# Patient Record
Sex: FEMALE | Race: WHITE | Employment: STUDENT | URBAN - NONMETROPOLITAN AREA
[De-identification: names, ages, dates, MRNs, and addresses within clinical notes are randomized per-mention and may not be internally consistent; named-entity substitution may affect disease eponyms.]

---

## 2018-09-04 ENCOUNTER — OFFICE VISIT (OUTPATIENT)
Dept: FAMILY MEDICINE | Facility: OTHER | Age: 23
End: 2018-09-04
Attending: FAMILY MEDICINE
Payer: COMMERCIAL

## 2018-09-04 VITALS
DIASTOLIC BLOOD PRESSURE: 64 MMHG | WEIGHT: 163 LBS | HEIGHT: 65 IN | HEART RATE: 68 BPM | BODY MASS INDEX: 27.16 KG/M2 | SYSTOLIC BLOOD PRESSURE: 100 MMHG

## 2018-09-04 DIAGNOSIS — Z00.00 ROUTINE GENERAL MEDICAL EXAMINATION AT A HEALTH CARE FACILITY: Primary | ICD-10-CM

## 2018-09-04 DIAGNOSIS — Z12.4 CERVICAL CANCER SCREENING: ICD-10-CM

## 2018-09-04 PROCEDURE — 99395 PREV VISIT EST AGE 18-39: CPT | Performed by: FAMILY MEDICINE

## 2018-09-04 PROCEDURE — 88142 CYTOPATH C/V THIN LAYER: CPT | Performed by: FAMILY MEDICINE

## 2018-09-04 PROCEDURE — G0123 SCREEN CERV/VAG THIN LAYER: HCPCS | Performed by: FAMILY MEDICINE

## 2018-09-04 RX ORDER — NORGESTIMATE AND ETHINYL ESTRADIOL 7DAYSX3 28
1 KIT ORAL DAILY
COMMUNITY
End: 2018-09-04

## 2018-09-04 RX ORDER — NORGESTIMATE AND ETHINYL ESTRADIOL
1 KIT DAILY
Refills: 0 | COMMUNITY
Start: 2018-07-10

## 2018-09-04 ASSESSMENT — PAIN SCALES - GENERAL: PAINLEVEL: NO PAIN (0)

## 2018-09-04 NOTE — PROGRESS NOTES
Female Physical Note    Concerns today: No special concerns today.    Nursing Notes:   Gisela Mahoney LPN  2018 10:19 AM  Signed  Patient is here for her yearly physical.  Gisela Mahoney LPN .......2018 10:14 AM       ROS:  CONSTITUTIONAL: no fatigue, no unexpected change in weight  SKIN: no worrisome rashes, no worrisome moles, no worrisome lesions  EYES: no acute vision problems or changes  ENT: no ear problems, no mouth problems, no throat problems  RESP: no significant cough, no shortness of breath  CV: no chest pain, no palpitations, no new or worsening peripheral edema  GI: no nausea, no vomiting, no constipation, no diarrhea    Sexually Active: Yes  Sexual concerns: No   Contraception:OCP-see med list   P: 0  Menarche:  Patient's last menstrual period was 2018. Menses: q 30 days  STD History: Neg  Last Pap Smear Date:  normal  Abnormal Pap History: None    There is no problem list on file for this patient.      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    Patient is an established patient of this clinic..    Social History   Substance Use Topics     Smoking status: Never Smoker     Smokeless tobacco:  "Never Used     Alcohol use No     Partnered  Children ? no    Has anyone hurt you physically, for example by pushing, hitting, slapping or kicking you or forcing you to have sex? Denies  Do you feel threatened or controlled by a partner, ex-partner or anyone in your life? Denies    RISK BEHAVIORS AND HEALTHY HABITS:  Tobacco Use/Smoking: None  Illicit Drug Use: None  Do you use alcohol? No  Diet (5-7 servings of fruits/veg daily): Yes   Exercise (30 min accumulated most days):Yes  Dental Care: Yes   Calcium 1500 mg/d:  Yes  Seat Belt Use: Yes     Pap every 3 years for women 21-29. Recommended and patient accepted testing.    CV Risk based on Pooled Cohort Risk:   Pooled Cohort Risk Calculator    Immunization History   Administered Date(s) Administered     DTAP (<7y) 03/12/1997     DTaP / Hep B / IPV 1995, 1995, 01/09/1996, 03/19/2001     HepA, Unspecified 03/12/1997     HepB, Unspecified 1995     Hib, Unspecified 1995, 1995, 01/09/1996, 03/12/1997     MMR 08/01/1996, 03/19/2001     Meningococcal (Menactra ) 08/13/2008     Polio, Unspecified  1995, 1995, 01/09/1996, 03/19/2001     Tdap (Adult) Unspecified Formulation 08/13/2008    Reviewed Immunization Record Today    EXAMINATION:   /64 (BP Location: Right arm, Patient Position: Sitting, Cuff Size: Adult Regular)  Pulse 68  Ht 5' 4.5\" (1.638 m)  Wt 163 lb (73.9 kg)  LMP 08/27/2018  BMI 27.55 kg/m2  GENERAL: healthy, alert and no distress  EYES: Eyes grossly normal to inspection, extraocular movements - intact, and PERRL  HENT: ear canals- normal; TMs- normal; Nose- normal; Mouth- no ulcers, no lesions  NECK: no tenderness, no adenopathy, no asymmetry, no masses, no stiffness; thyroid- normal to palpation  RESP: lungs clear to auscultation - no rales, no rhonchi, no wheezes  CV: regular rates and rhythm, normal S1 S2, no S3 or S4 and no murmur, no click or rub -  ABDOMEN: soft, no tenderness, no  hepatosplenomegaly, " no masses, normal bowel sounds  SKIN: no suspicious lesions, no rashes  NEURO: strength and tone- normal, sensory exam- grossly normal, mentation- intact, speech- normal, reflexes- symmetric  BACK: no CVA tenderness, no paralumbar tenderness  - female: cervix- normal, adnexae- normal; uterus- normal, no masses, no discharge  PSYCH: Alert and oriented times 3; speech- coherent , normal rate and volume; able to articulate logical thoughts, able to abstract reason, no tangential thoughts, no hallucinations or delusions, affect- normal  LYMPHATICS: ant. cervical- normal, post. cervical- normal, axillary- normal, supraclavicular- normal, inguinal- normal    ASSESSMENT:  1. Routine general medical examination at a health care facility    2. Cervical cancer screening          PLAN:  Discussed options for contraception.  She strongly desires IUD placement.  She will return  after her next menses for placement.  Warned that if she comes off birth control pills her acne may worsen.  Pap smear was obtained today, recommend every 3 years with HPV.  Declines STD screening.  She has had one partner and STD screening last year was negative.  Immunizations are current.      Liudmila Francis MD

## 2018-09-04 NOTE — LETTER
September 6, 2018    Eloisa Villalta  [address] [place] [address]    Dear Eloisa,  We are happy to inform you that your PAP smear result  is normal.  We are now able to do a follow up test on PAP smears. The DNA test is for HPV (Human Papilloma Virus). Cervical cancer is closely linked with certain types of HPV. Your results showed no evidence of high risk HPV.  Therefore we recommend you return in 1 year for your next pap smear.  You will still need to return to the clinic every year for an annual exam and other preventive tests.  Please contact the clinic at [phone] with any questions.  Sincerely,    Liudmila Francis MD

## 2018-09-04 NOTE — MR AVS SNAPSHOT
After Visit Summary   9/4/2018    Eloisa Villalta    MRN: [id number]           Patient Information     Date Of Birth          1995        Visit Information        Provider Department      9/4/2018 10:00 AM Liudmila Yoon MD [facility]        Today's Diagnoses     Routine general medical examination at a health care facility    -  1    Cervical cancer screening           Follow-ups after your visit        Your next 10 appointments already scheduled     Sep 17, 2018  1:30 PM CDT   Office Visit with Liudmila Mccollum MD   [facility] ([facility])    [address]  [place] [id number] [phone]           Bring a current list of meds and any records pertaining to this visit. For Physicals, please bring immunization records and any forms needing to be filled out. Please arrive 10 minutes early to complete paperwork.              Future tests that were ordered for you today     Open Future Orders        Priority Expected Expires Ordered    HPV High Risk Types DNA Cervical Routine  9/4/2019 9/4/2018            Who to contact     If you have questions or need follow up information about today's clinic visit or your schedule please contact [facility] directly at [phone].  Normal or non-critical lab and imaging results will be communicated to you by MyChart, letter or phone within 4 business days after the clinic has received the results. If you do not hear from us within 7 days, please contact the clinic through MyChart or phone. If you have a critical or abnormal lab result, we will notify you by phone as soon as possible.  Submit refill requests through [employer] or call your pharmacy and they will forward the refill request to us. Please allow 3 business days for your refill to be completed.          Additional Information About Your Visit        MyChart  "Information     [employer] lets you send messages to your doctor, view your test results, renew your prescriptions, schedule appointments and more. To sign up, go to [email or web address] . Click on \"Log in\" on the left side of the screen, which will take you to the Welcome page. Then click on \"Sign up Now\" on the right side of the page.     You will be asked to enter the access code listed below, as well as some personal information. Please follow the directions to create your username and password.     Your access code is: [email or web address]  Expires: 12/3/2018 11:07 AM     Your access code will  in 90 days. If you need help or a new code, please call your [facility] clinic or [phone].        Care EveryWhere ID     This is your [facility] EveryWhere ID. This could be used by other organizations to access your [facility] medical records  [id number]        Your Vitals Were     Pulse Height Last Period BMI (Body Mass Index)          68 5' 4.5\" (1.638 m) 2018 27.55 kg/m2         Blood Pressure from Last 3 Encounters:   18 100/64    Weight from Last 3 Encounters:   18 163 lb (73.9 kg)              We Performed the Following     Pap Screen Thin Prep        Primary Care Provider Office Phone # Fax #    [facility] [phone] [address]  [facility] [address]        Equal Access to Services     [facility] AH: Hadii richard Rajput, waaxda luqadaha, qaybta kaalmada luceroda, shala farley. So [facility] [phone].    ATENCIÓN: Si habla español, tiene a pereira disposición servicios gratuitos de asistencia lingüística. Llame al [phone].    We comply with applicable federal civil rights laws and [place] laws. We do not discriminate on the basis of race, color, national origin, age, disability, sex, sexual orientation, or gender identity.            Thank you!     Thank you for choosing [facility]  for " your care. Our goal is always to provide you with excellent care. Hearing back from our patients is one way we can continue to improve our services. Please take a few minutes to complete the written survey that you may receive in the mail after your visit with us. Thank you!             Your Updated Medication List - Protect others around you: Learn how to safely use, store and throw away your medicines at www.disposemymeds.org.          This list is accurate as of 9/4/18 11:07 AM.  Always use your most recent med list.                   Brand Name Dispense Instructions for use Diagnosis    TRI-LO-TRACIE 0.18/0.215/0.25 MG-25 MCG per tablet   Generic drug:  norgestim-eth estrad triphasic      Take 1 tablet by mouth daily

## 2018-09-06 ASSESSMENT — PATIENT HEALTH QUESTIONNAIRE - PHQ9: SUM OF ALL RESPONSES TO PHQ QUESTIONS 1-9: 0

## 2018-09-17 ENCOUNTER — OFFICE VISIT (OUTPATIENT)
Dept: FAMILY MEDICINE | Facility: OTHER | Age: 23
End: 2018-09-17
Attending: FAMILY MEDICINE
Payer: COMMERCIAL

## 2018-09-17 VITALS
HEIGHT: 65 IN | SYSTOLIC BLOOD PRESSURE: 114 MMHG | TEMPERATURE: 98 F | HEART RATE: 72 BPM | RESPIRATION RATE: 12 BRPM | BODY MASS INDEX: 27.42 KG/M2 | WEIGHT: 164.6 LBS | DIASTOLIC BLOOD PRESSURE: 68 MMHG

## 2018-09-17 DIAGNOSIS — Z30.430 ENCOUNTER FOR INTRAUTERINE DEVICE PLACEMENT: Primary | ICD-10-CM

## 2018-09-17 LAB — HCG UR QL: NEGATIVE

## 2018-09-17 PROCEDURE — 25000125 ZZHC RX 250: Performed by: FAMILY MEDICINE

## 2018-09-17 PROCEDURE — 81025 URINE PREGNANCY TEST: CPT | Performed by: FAMILY MEDICINE

## 2018-09-17 PROCEDURE — 58300 INSERT INTRAUTERINE DEVICE: CPT | Performed by: FAMILY MEDICINE

## 2018-09-17 RX ADMIN — LEVONORGESTREL 13.5 MG: 13.5 INTRAUTERINE DEVICE INTRAUTERINE at 15:26

## 2018-09-17 ASSESSMENT — PAIN SCALES - GENERAL: PAINLEVEL: NO PAIN (0)

## 2018-09-17 NOTE — PROGRESS NOTES
INDICATIONS:                                                      Is a pregnancy test required: Yes.  Was it positive or negative?  Negative  Was a consent obtained?  Yes    Eloisa Villalta is a 23 year old female, No obstetric history on file.  who presents for insertion of an IUD. The risks, benefits and alternatives of IUD insertion were discussed in detail previously. She also has reviewed the product brochure.  She has elected to go ahead with the insertion  today and her questions were answered. Consent was signed. Her LMP began on 8/20 and was normal in duration and amount of flow. A pregnancy test was performed today:  Yes    Today's PHQ-2 Score:   PHQ-2 ( 1999 Pfizer) 9/17/2018   Q1: Little interest or pleasure in doing things 0   Q2: Feeling down, depressed or hopeless 0   PHQ-2 Score 0       PROCEDURE:                                                      The pelvic exam revealed normal external genitalia. On bimanual exam the uterus was Retroverted and normal in size with no tenderness present. A speculum was inserted into the vagina and the cervix was visualized. The cervix was prepped with Betadine . The anterior lip of the cervix was grasped with a single toothed tenaculum. The uterus sounded to 7 cm. A Zeinab IUD was then inserted without difficulty. The string was cut to 3 cm.  The patient experienced a mild amount of cramping.    POST PROCEDURE:                                                      She  tolerated the procedure well. There were no complications. Patient was discharged in stable condition.    Return to clinic in 5 weeks for IUD check.  Call if severe cramping, fever, abnormal bleeding, abnormal discharge or pelvic pain develop.  Patient was counseled about the chance of irregular bleeding.    Liudmila Francis MD

## 2018-09-17 NOTE — NURSING NOTE
"Patient is here to have iud placed. Patient is thinking the mirena.  Es Hendrix LPN .............9/17/2018     1:45 PM        Chief Complaint   Patient presents with     IUD     placement       Initial /68 (BP Location: Right arm, Patient Position: Sitting, Cuff Size: Adult Regular)  Pulse 72  Temp 98  F (36.7  C) (Temporal)  Resp 12  Ht 5' 4.5\" (1.638 m)  Wt 164 lb 9.6 oz (74.7 kg)  LMP 08/27/2018  BMI 27.82 kg/m2 Estimated body mass index is 27.82 kg/(m^2) as calculated from the following:    Height as of this encounter: 5' 4.5\" (1.638 m).    Weight as of this encounter: 164 lb 9.6 oz (74.7 kg).  Medication Reconciliation: complete    Es Hendrix LPN    "

## 2018-09-17 NOTE — MR AVS SNAPSHOT
"              After Visit Summary   9/17/2018    Eloisa Villalta    MRN: [id number]           Patient Information     Date Of Birth          1995        Visit Information        Provider Department      9/17/2018 1:30 PM Liudmila Yoon MD [facility]        Today's Diagnoses     Encounter for intrauterine device placement    -  1       Follow-ups after your visit        Who to contact     If you have questions or need follow up information about today's clinic visit or your schedule please contact [facility] directly at [phone].  Normal or non-critical lab and imaging results will be communicated to you by MyChart, letter or phone within 4 business days after the clinic has received the results. If you do not hear from us within 7 days, please contact the clinic through MyChart or phone. If you have a critical or abnormal lab result, we will notify you by phone as soon as possible.  Submit refill requests through [facility] or call your pharmacy and they will forward the refill request to us. Please allow 3 business days for your refill to be completed.          Additional Information About Your Visit        Care EveryWhere ID     This is your Care EveryWhere ID. This could be used by other organizations to access your [facility] medical records  [id number]        Your Vitals Were     Pulse Temperature Respirations Height Last Period BMI (Body Mass Index)    72 98  F (36.7  C) (Temporal) 12 5' 4.5\" (1.638 m) 08/27/2018 27.82 kg/m2       Blood Pressure from Last 3 Encounters:   09/17/18 114/68   09/04/18 100/64    Weight from Last 3 Encounters:   09/17/18 164 lb 9.6 oz (74.7 kg)   09/04/18 163 lb (73.9 kg)              We Performed the Following     INSERTION INTRAUTERINE DEVICE     Pregnancy, Urine (HCG)        Primary Care Provider Office Phone # Fax #    [facility] [phone] [address] " [facility] [address]        Equal Access to Services     [facility] SHARDA : Hadii aad ku hadirageraldine Rajput, warosiemauro marks, arthurshala betancourt. So [facility] [phone].    ATENCIÓN: Si habla español, tiene a pereira disposición servicios gratuitos de asistencia lingüística. Llame al [phone].    We comply with applicable federal civil rights laws and [place] laws. We do not discriminate on the basis of race, color, national origin, age, disability, sex, sexual orientation, or gender identity.            Thank you!     Thank you for choosing [facility]  for your care. Our goal is always to provide you with excellent care. Hearing back from our patients is one way we can continue to improve our services. Please take a few minutes to complete the written survey that you may receive in the mail after your visit with us. Thank you!             Your Updated Medication List - Protect others around you: Learn how to safely use, store and throw away your medicines at www.disposemymeds.org.          This list is accurate as of 9/17/18  2:10 PM.  Always use your most recent med list.                   Brand Name Dispense Instructions for use Diagnosis    TRI-LO-TRACIE 0.18/0.215/0.25 MG-25 MCG per tablet   Generic drug:  norgestim-eth estrad triphasic      Take 1 tablet by mouth daily

## 2018-11-20 NOTE — TELEPHONE ENCOUNTER
Chart review shows that patient had an IUD placed on 9/17/18. Was to follow up in 5 weeks time and has failed to do so. Writer is also unsure if patient is to be taking OCP with use of an IUD. Call placed to patient to discuss. Patient was unavailable at this time. Writer did leave message for patient about need for a follow up office visit with Dr. Francis as noted above. However, was unable to clarify if patient is indeed requesting a refill. Writer will danay up and route Rx request to Dr. Francis for her consideration/approval upon her return on 11/23/18. No diagnosis to associate is noted as well.    Unable to complete prescription refill per RN Medication Refill Policy. Tomas Villa 11/20/2018 3:10 PM

## 2018-11-22 RX ORDER — NORGESTIMATE AND ETHINYL ESTRADIOL 7DAYSX3 LO
1 KIT ORAL DAILY
Qty: 84 TABLET | OUTPATIENT
Start: 2018-11-22

## 2018-11-23 DIAGNOSIS — T38.5X5A: Primary | ICD-10-CM

## 2018-11-23 RX ORDER — INFLUENZA A VIRUS A/VICTORIA/2454/2019 IVR-207 (H1N1) ANTIGEN (PROPIOLACTONE INACTIVATED), INFLUENZA A VIRUS A/HONG KONG/2671/2019 IVR-208 (H3N2) ANTIGEN (PROPIOLACTONE INACTIVATED), INFLUENZA B VIRUS B/VICTORIA/705/2018 BVR-11 ANTIGEN (PROPIOLACTONE INACTIVATED), INFLUENZA B VIRUS B/PHUKET/3073/2013 BVR-1B ANTIGEN (PROPIOLACTONE INACTIVATED) 15; 15; 15; 15 UG/.5ML; UG/.5ML; UG/.5ML; UG/.5ML
INJECTION, SUSPENSION INTRAMUSCULAR
Refills: 0 | COMMUNITY
Start: 2018-09-18

## 2018-11-23 RX ORDER — NORGESTIMATE AND ETHINYL ESTRADIOL 7DAYSX3 28
1 KIT ORAL
COMMUNITY

## 2018-11-23 RX ORDER — NORGESTIMATE AND ETHINYL ESTRADIOL
1 KIT DAILY
Qty: 28 TABLET | Refills: 0 | Status: CANCELLED | OUTPATIENT
Start: 2018-11-23

## 2018-11-23 NOTE — TELEPHONE ENCOUNTER
"Refill request from [facility] in [place], [place] for  TRI-LO-TRACIE 0.18/0.215/0.25 MG-25 MCG per tablet    Noted presciber info on fax request is \"Diya Marktz\"[facility]    Also noted IUD (levonorgestrel (MALACHI) 13.5 MG IUD 13.5 mg)     was placed 9/17/2018 in [facility] with Dr. Francis.    Attempted to contact patient.  No answer.  Left message to return my call at ext [phone] until 4:00 today.      Contacted pharmacy and they share that a transfer was requested from [facility] to [facility] in [place].         Requested Prescriptions   Pending Prescriptions Disp Refills     TRI-LO-TRACIE 0.18/0.215/0.25 MG-25 MCG per tablet 28 tablet 0     Sig: Take 1 tablet by mouth daily    Contraceptives Protocol Failed    11/23/2018 11:22 AM       Failed - Recent (12 mo) or future (30 days) visit within the authorizing provider's specialty    Patient had office visit in the last 12 months or has a visit in the next 30 days with authorizing provider or within the authorizing provider's specialty.  See \"Patient Info\" tab in inbasket, or \"Choose Columns\" in Meds & Orders section of the refill encounter.             Passed - Patient is not a current smoker if age is 35 or older       Passed - No active pregnancy on record       Passed - No positive pregnancy test in past 12 months      Signed Prescriptions Disp Refills     AFLURIA QUADRIVALENT 0.5 ML injection  0     Sig: ADM 0.5ML IM UTD    There is no refill protocol information for this order        norgestim-eth estrad triphasic (ORTHO TRI-CYCLEN, 28,) 0.18/0.215/0.25 MG-35 MCG per tablet       Sig: Take 1 tablet by mouth    There is no refill protocol information for this order          "

## 2018-11-26 NOTE — TELEPHONE ENCOUNTER
No return call noted.  Unable to complete refill at this time.    Unable to complete prescription refill per RN Medication Refill Policy.................... Denai Dillard ....................  11/26/2018   2:47 PM

## 2019-11-18 ENCOUNTER — ALLIED HEALTH/NURSE VISIT (OUTPATIENT)
Dept: FAMILY MEDICINE | Facility: OTHER | Age: 24
End: 2019-11-18
Payer: COMMERCIAL

## 2019-11-18 DIAGNOSIS — Z23 NEED FOR PROPHYLACTIC VACCINATION AND INOCULATION AGAINST INFLUENZA: ICD-10-CM

## 2019-11-18 DIAGNOSIS — Z11.1 SCREENING EXAMINATION FOR PULMONARY TUBERCULOSIS: ICD-10-CM

## 2019-11-18 PROCEDURE — 90686 IIV4 VACC NO PRSV 0.5 ML IM: CPT

## 2019-11-18 PROCEDURE — 90471 IMMUNIZATION ADMIN: CPT

## 2019-11-18 PROCEDURE — 86580 TB INTRADERMAL TEST: CPT

## 2019-11-18 NOTE — PROGRESS NOTES
Verified patient's first and last name, and . Patient stated reason for visit today is to receive Mantoux for employment purposes and flu vaccine.    The patient is asked the following questions today and these are her answers:    -Have you had a mantoux administered in the past 30 days?    No  -Have you had a previous positive Mantoux.  No  -Have you received BCG in the past.  No  -Have you had a live vaccine  (MMR, Varicella, OPV, Yellow Fever) in the last 6 weeks.  No  -Have you had and active  viral or bacterial infection in the past 6 weeks.  No  -Have you received corticosteroids or immunosuppressive agents in the past 6 weeks.  No  -Have you been diagnosed with HIV?  No  -Do you have a malignancy?  No    Mantoux Questionnaire: answers were all negative.    Mantoux administered. Mantoux Report Form completed. Nurse to keep form until mantoux reading. Patient educated on mantoux reading appointment and patient agreed to make appointment for reading.       Influenza Vaccination Documentation  Patient denied any concerns with previous influenza vaccinations. Influenza vaccination screening questions answered (see IMMUNIZATIONS for answers). Allergies reviewed. Influenza vaccine order placed per standing order. VIS handout reviewed and given to patient to take home. Influenza prepared and administered IM into right deltoid. Administration documented in IMMUNIZATIONS (see flowsheet and order for further information). Patient instructed to wait in lobby for 15 minutes post-injection and notify staff immediately of any reaction.      Daisy ROBERTSN, RN on 2019 at 1:22 PM

## 2019-11-21 ENCOUNTER — ALLIED HEALTH/NURSE VISIT (OUTPATIENT)
Dept: FAMILY MEDICINE | Facility: OTHER | Age: 24
End: 2019-11-21
Payer: COMMERCIAL

## 2019-11-21 DIAGNOSIS — Z11.1 SCREENING EXAMINATION FOR PULMONARY TUBERCULOSIS: Primary | ICD-10-CM

## 2019-11-21 LAB
PPDINDURATION: 0 MM (ref 0–5)
PPDREDNESS: 0 MM

## 2019-11-21 PROCEDURE — 99207 ZZC NO CHARGE NURSE ONLY: CPT

## 2019-11-21 NOTE — PROGRESS NOTES
Verified patient's first and last name, and . Patient stated reason for visit today is to have mantoux result reading completed. Mantoux negative. Patient provided completed Mantoux Report form to provided to employer. Copy to be sent to HIM to be scanned into EHR.     Mantoux result:  Lab Results   Component Value Date    PPDREDNESS 0 2019    PPDINDURATIO 0 2019         Dasiy MORALES, RN on 2019 at 11:02 AM